# Patient Record
Sex: FEMALE | Race: WHITE | NOT HISPANIC OR LATINO | Employment: FULL TIME | URBAN - METROPOLITAN AREA
[De-identification: names, ages, dates, MRNs, and addresses within clinical notes are randomized per-mention and may not be internally consistent; named-entity substitution may affect disease eponyms.]

---

## 2023-11-08 ENCOUNTER — LAB (OUTPATIENT)
Dept: LAB | Facility: LAB | Age: 58
End: 2023-11-08
Payer: COMMERCIAL

## 2023-11-08 ENCOUNTER — OFFICE VISIT (OUTPATIENT)
Dept: PRIMARY CARE | Facility: CLINIC | Age: 58
End: 2023-11-08
Payer: COMMERCIAL

## 2023-11-08 ENCOUNTER — HOSPITAL ENCOUNTER (OUTPATIENT)
Dept: RADIOLOGY | Facility: HOSPITAL | Age: 58
Discharge: HOME | End: 2023-11-08
Payer: COMMERCIAL

## 2023-11-08 VITALS
DIASTOLIC BLOOD PRESSURE: 68 MMHG | WEIGHT: 126.4 LBS | BODY MASS INDEX: 22.39 KG/M2 | HEART RATE: 76 BPM | OXYGEN SATURATION: 100 % | SYSTOLIC BLOOD PRESSURE: 108 MMHG

## 2023-11-08 DIAGNOSIS — R53.83 OTHER FATIGUE: ICD-10-CM

## 2023-11-08 DIAGNOSIS — R07.9 CHEST PAIN, UNSPECIFIED TYPE: Primary | ICD-10-CM

## 2023-11-08 DIAGNOSIS — E78.00 HYPERCHOLESTEREMIA: ICD-10-CM

## 2023-11-08 DIAGNOSIS — R07.9 CHEST PAIN, UNSPECIFIED TYPE: ICD-10-CM

## 2023-11-08 DIAGNOSIS — Z00.00 ANNUAL PHYSICAL EXAM: ICD-10-CM

## 2023-11-08 DIAGNOSIS — R06.00 DYSPNEA, UNSPECIFIED TYPE: ICD-10-CM

## 2023-11-08 DIAGNOSIS — J45.20 MILD INTERMITTENT ASTHMA WITHOUT COMPLICATION (HHS-HCC): ICD-10-CM

## 2023-11-08 LAB
ALBUMIN SERPL BCP-MCNC: 4.7 G/DL (ref 3.4–5)
ALP SERPL-CCNC: 60 U/L (ref 33–110)
ALT SERPL W P-5'-P-CCNC: 11 U/L (ref 7–45)
ANION GAP SERPL CALC-SCNC: 15 MMOL/L (ref 10–20)
AST SERPL W P-5'-P-CCNC: 16 U/L (ref 9–39)
BASOPHILS # BLD AUTO: 0.05 X10*3/UL (ref 0–0.1)
BASOPHILS NFR BLD AUTO: 0.9 %
BILIRUB SERPL-MCNC: 0.5 MG/DL (ref 0–1.2)
BUN SERPL-MCNC: 11 MG/DL (ref 6–23)
CALCIUM SERPL-MCNC: 9.5 MG/DL (ref 8.6–10.3)
CARDIAC TROPONIN I PNL SERPL HS: <3 NG/L (ref 0–13)
CHLORIDE SERPL-SCNC: 104 MMOL/L (ref 98–107)
CO2 SERPL-SCNC: 29 MMOL/L (ref 21–32)
CREAT SERPL-MCNC: 0.64 MG/DL (ref 0.5–1.05)
D DIMER PPP FEU-MCNC: <215 NG/ML FEU
EOSINOPHIL # BLD AUTO: 0.3 X10*3/UL (ref 0–0.7)
EOSINOPHIL NFR BLD AUTO: 5.2 %
ERYTHROCYTE [DISTWIDTH] IN BLOOD BY AUTOMATED COUNT: 12.2 % (ref 11.5–14.5)
GFR SERPL CREATININE-BSD FRML MDRD: >90 ML/MIN/1.73M*2
GLUCOSE SERPL-MCNC: 103 MG/DL (ref 74–99)
HCT VFR BLD AUTO: 39.8 % (ref 36–46)
HGB BLD-MCNC: 13.4 G/DL (ref 12–16)
IMM GRANULOCYTES # BLD AUTO: 0.01 X10*3/UL (ref 0–0.7)
IMM GRANULOCYTES NFR BLD AUTO: 0.2 % (ref 0–0.9)
LYMPHOCYTES # BLD AUTO: 2.04 X10*3/UL (ref 1.2–4.8)
LYMPHOCYTES NFR BLD AUTO: 35.2 %
MCH RBC QN AUTO: 32.2 PG (ref 26–34)
MCHC RBC AUTO-ENTMCNC: 33.7 G/DL (ref 32–36)
MCV RBC AUTO: 96 FL (ref 80–100)
MONOCYTES # BLD AUTO: 0.28 X10*3/UL (ref 0.1–1)
MONOCYTES NFR BLD AUTO: 4.8 %
NEUTROPHILS # BLD AUTO: 3.11 X10*3/UL (ref 1.2–7.7)
NEUTROPHILS NFR BLD AUTO: 53.7 %
NRBC BLD-RTO: 0 /100 WBCS (ref 0–0)
PLATELET # BLD AUTO: 296 X10*3/UL (ref 150–450)
POTASSIUM SERPL-SCNC: 3.9 MMOL/L (ref 3.5–5.3)
PROT SERPL-MCNC: 7.3 G/DL (ref 6.4–8.2)
RBC # BLD AUTO: 4.16 X10*6/UL (ref 4–5.2)
SODIUM SERPL-SCNC: 144 MMOL/L (ref 136–145)
WBC # BLD AUTO: 5.8 X10*3/UL (ref 4.4–11.3)

## 2023-11-08 PROCEDURE — 93000 ELECTROCARDIOGRAM COMPLETE: CPT | Performed by: INTERNAL MEDICINE

## 2023-11-08 PROCEDURE — 99214 OFFICE O/P EST MOD 30 MIN: CPT | Performed by: INTERNAL MEDICINE

## 2023-11-08 PROCEDURE — 1036F TOBACCO NON-USER: CPT | Performed by: INTERNAL MEDICINE

## 2023-11-08 PROCEDURE — 71250 CT THORAX DX C-: CPT | Mod: FOREIGN READ | Performed by: RADIOLOGY

## 2023-11-08 PROCEDURE — 36415 COLL VENOUS BLD VENIPUNCTURE: CPT

## 2023-11-08 PROCEDURE — 71250 CT THORAX DX C-: CPT

## 2023-11-08 RX ORDER — ALBUTEROL SULFATE 90 UG/1
2 AEROSOL, METERED RESPIRATORY (INHALATION) EVERY 4 HOURS PRN
COMMUNITY
Start: 2003-11-06 | End: 2023-11-08

## 2023-11-08 RX ORDER — ALBUTEROL SULFATE 90 UG/1
2 AEROSOL, METERED RESPIRATORY (INHALATION) EVERY 4 HOURS PRN
Qty: 18 G | Refills: 0 | Status: SHIPPED | OUTPATIENT
Start: 2023-11-08 | End: 2024-11-07

## 2023-11-08 RX ORDER — METHYLPREDNISOLONE 4 MG/1
TABLET ORAL
Qty: 21 TABLET | Refills: 0 | Status: SHIPPED | OUTPATIENT
Start: 2023-11-08 | End: 2023-11-15

## 2023-11-08 NOTE — PROGRESS NOTES
Subjective   Patient ID: Destiny Yañez is a 57 y.o. female who presents for heavy chest (X 3-4 days/Hx of asthma- /Some possible congestion).  HPI      Same day sick    Last seen 11-22    Chest heavy, feels not getting enough air  Denies cough, wheezing, fever    asthma stable on rx no side effects  uses albuterol rarely     Hypercholesterolemia on diet     osteoporosis, vit D  on supplements     GYN follow up thru [facility]  Mammogram 3-23     diet / exercise rev'd    Review of Systems   All other systems reviewed and are negative.      Objective   /68   Pulse 76   Wt 57.3 kg (126 lb 6.4 oz)   SpO2 100%   BMI 22.39 kg/m²   Lab Results   Component Value Date    WBC 5.8 11/08/2023    HGB 13.4 11/08/2023    HCT 39.8 11/08/2023     11/08/2023    CHOL 242 (H) 07/02/2021    TRIG 90 07/02/2021    HDL 72.7 07/02/2021    ALT 11 11/08/2023    AST 16 11/08/2023     11/08/2023    K 3.9 11/08/2023     11/08/2023    CREATININE 0.64 11/08/2023    BUN 11 11/08/2023    CO2 29 11/08/2023    TSH 1.36 07/02/2021           Physical Exam  Vitals reviewed.   Constitutional:       Appearance: Normal appearance. She is normal weight.   HENT:      Head: Normocephalic and atraumatic.      Mouth/Throat:      Pharynx: No posterior oropharyngeal erythema.   Eyes:      General: No scleral icterus.     Conjunctiva/sclera: Conjunctivae normal.      Pupils: Pupils are equal, round, and reactive to light.   Cardiovascular:      Rate and Rhythm: Normal rate and regular rhythm.      Heart sounds: Normal heart sounds.   Pulmonary:      Effort: No respiratory distress.      Breath sounds: No wheezing.   Abdominal:      General: Abdomen is flat. Bowel sounds are normal. There is no distension.      Palpations: Abdomen is soft. There is no mass.      Tenderness: There is no abdominal tenderness. There is no rebound.   Musculoskeletal:         General: Normal range of motion.      Cervical back: Normal range of motion and  neck supple.   Skin:     General: Skin is warm and dry.   Neurological:      General: No focal deficit present.      Mental Status: She is alert and oriented to person, place, and time. Mental status is at baseline.   Psychiatric:         Mood and Affect: Mood normal.         Behavior: Behavior normal.         Thought Content: Thought content normal.         Judgment: Judgment normal.         Problem List Items Addressed This Visit    None  Visit Diagnoses         Codes    Chest pain, unspecified type    -  Primary R07.9    Relevant Orders    ECG 12 lead (Clinic Performed)    Comprehensive Metabolic Panel (Completed)    Troponin I, High Sensitivity (Completed)    D-dimer, VTE Exclusion (Completed)    CT chest wo IV contrast (Completed)    Dyspnea, unspecified type     R06.00    Mild intermittent asthma without complication     J45.20    Relevant Medications    methylPREDNISolone (Medrol Dospak) 4 mg tablets    albuterol 90 mcg/actuation inhaler    Other fatigue     R53.83    Relevant Orders    CBC and Auto Differential (Completed)    Hypercholesteremia     E78.00          Assessment/Plan     Same day sick    Last seen 11-22    Chest heavy, feels not getting enough air x 3-4 days  Denies cough, wheezing, fever  EKG ST elevation V1, V2 short MI  Will need cardio follow up  Check CT chest and labs stat  Acute exacerbation asthma  Start medrol dose melinda as directed  Albuterol inhaler as directed  Risk / benefits rev'd  Rest increase fluids    asthma uses albuterol rarely     Hypercholesterolemia on diet     osteoporosis, vit D  on supplements     GYN follow up thru [facility]  Mammogram 3-23     diet / exercise rev'd    Follow up 1 week

## 2023-12-14 ENCOUNTER — OFFICE VISIT (OUTPATIENT)
Dept: PRIMARY CARE | Facility: CLINIC | Age: 58
End: 2023-12-14
Payer: COMMERCIAL

## 2023-12-14 VITALS
DIASTOLIC BLOOD PRESSURE: 58 MMHG | BODY MASS INDEX: 23.66 KG/M2 | OXYGEN SATURATION: 100 % | WEIGHT: 128.6 LBS | HEIGHT: 62 IN | HEART RATE: 61 BPM | SYSTOLIC BLOOD PRESSURE: 108 MMHG

## 2023-12-14 DIAGNOSIS — E55.9 VITAMIN D DEFICIENCY: ICD-10-CM

## 2023-12-14 DIAGNOSIS — E78.00 HYPERCHOLESTEREMIA: ICD-10-CM

## 2023-12-14 DIAGNOSIS — J45.909 MILD ASTHMA WITHOUT COMPLICATION, UNSPECIFIED WHETHER PERSISTENT (HHS-HCC): ICD-10-CM

## 2023-12-14 DIAGNOSIS — Z12.31 ENCOUNTER FOR SCREENING MAMMOGRAM FOR MALIGNANT NEOPLASM OF BREAST: ICD-10-CM

## 2023-12-14 DIAGNOSIS — F41.9 ANXIETY: ICD-10-CM

## 2023-12-14 DIAGNOSIS — M81.0 AGE RELATED OSTEOPOROSIS, UNSPECIFIED PATHOLOGICAL FRACTURE PRESENCE: ICD-10-CM

## 2023-12-14 DIAGNOSIS — Z00.00 ANNUAL PHYSICAL EXAM: Primary | ICD-10-CM

## 2023-12-14 PROCEDURE — 99214 OFFICE O/P EST MOD 30 MIN: CPT | Performed by: INTERNAL MEDICINE

## 2023-12-14 PROCEDURE — 3008F BODY MASS INDEX DOCD: CPT | Performed by: INTERNAL MEDICINE

## 2023-12-14 PROCEDURE — 1036F TOBACCO NON-USER: CPT | Performed by: INTERNAL MEDICINE

## 2023-12-14 PROCEDURE — 99396 PREV VISIT EST AGE 40-64: CPT | Performed by: INTERNAL MEDICINE

## 2023-12-14 ASSESSMENT — PATIENT HEALTH QUESTIONNAIRE - PHQ9
1. LITTLE INTEREST OR PLEASURE IN DOING THINGS: NOT AT ALL
SUM OF ALL RESPONSES TO PHQ9 QUESTIONS 1 AND 2: 0
2. FEELING DOWN, DEPRESSED OR HOPELESS: NOT AT ALL

## 2023-12-14 NOTE — PROGRESS NOTES
"Subjective   Patient ID: Destiny Yañez is a 58 y.o. female who presents for Annual Exam (Yearly Physical). / follow up  HPI    Yearly physical    Mammogram due 3-24  Dexa 6-2021  Cologuard 11-22 neg  CT chest lung cancer screening n/a  GYN 9-23  immunizations rev'd  BMI 23.5    Follow up        Anxiety due to daughter transitioning       counselled    asthma uses albuterol prn     Hypercholesterolemia on diet     osteoporosis, vit D  on supplements     GYN follow up thru [facility]  Mammogram 3-23     diet / exercise rev'd    Review of Systems   All other systems reviewed and are negative.      Objective   /58   Pulse 61   Ht 1.575 m (5' 2\")   Wt 58.3 kg (128 lb 9.6 oz)   SpO2 100%   BMI 23.52 kg/m²   Lab Results   Component Value Date    WBC 5.8 11/08/2023    HGB 13.4 11/08/2023    HCT 39.8 11/08/2023     11/08/2023    CHOL 242 (H) 07/02/2021    TRIG 90 07/02/2021    HDL 72.7 07/02/2021    ALT 11 11/08/2023    AST 16 11/08/2023     11/08/2023    K 3.9 11/08/2023     11/08/2023    CREATININE 0.64 11/08/2023    BUN 11 11/08/2023    CO2 29 11/08/2023    TSH 1.36 07/02/2021           Physical Exam  Vitals reviewed.   Constitutional:       Appearance: Normal appearance. She is normal weight.      Comments: tearful   HENT:      Head: Normocephalic and atraumatic.      Mouth/Throat:      Pharynx: No posterior oropharyngeal erythema.   Eyes:      General: No scleral icterus.     Conjunctiva/sclera: Conjunctivae normal.      Pupils: Pupils are equal, round, and reactive to light.   Cardiovascular:      Rate and Rhythm: Normal rate and regular rhythm.      Heart sounds: Normal heart sounds.   Pulmonary:      Effort: No respiratory distress.      Breath sounds: No wheezing.   Abdominal:      General: Abdomen is flat. Bowel sounds are normal. There is no distension.      Palpations: Abdomen is soft. There is no mass.      Tenderness: There is no abdominal tenderness. There is no rebound. "   Musculoskeletal:         General: Normal range of motion.      Cervical back: Normal range of motion and neck supple.   Skin:     General: Skin is warm and dry.   Neurological:      General: No focal deficit present.      Mental Status: She is alert and oriented to person, place, and time. Mental status is at baseline.   Psychiatric:         Mood and Affect: Mood normal.         Behavior: Behavior normal.         Thought Content: Thought content normal.         Judgment: Judgment normal.         Problem List Items Addressed This Visit    None  Visit Diagnoses         Codes    Annual physical exam    -  Primary Z00.00    Relevant Orders    CBC and Auto Differential    Comprehensive Metabolic Panel    Lipid Panel    TSH with reflex to Free T4 if abnormal    Mild asthma without complication, unspecified whether persistent     J45.909    Hypercholesteremia     E78.00    Anxiety     F41.9    Encounter for screening mammogram for malignant neoplasm of breast     Z12.31    Relevant Orders    BI mammo bilateral screening tomosynthesis    Age related osteoporosis, unspecified pathological fracture presence     M81.0    Relevant Orders    XR DEXA bone density    Vitamin D deficiency     E55.9    Relevant Orders    Vitamin D 25-Hydroxy,Total (for eval of Vitamin D levels)    BMI 23.0-23.9, adult     Z68.23          Assessment/Plan       Yearly physical    Mammogram due 3-24  Dexa 6-2021  Cologuard 11-22 neg  CT chest lung cancer screening n/a  GYN 9-23  immunizations rev'd  BMI 23.5    Follow up        Anxiety due to daughter transitioning       counselled    asthma uses albuterol prn     Hypercholesterolemia on diet     osteoporosis, vit D  on supplements     GYN follow up thru [facility]  Mammogram 3-23     diet / exercise rev'd    Check labs, dexa    Follow up pending

## 2023-12-30 ENCOUNTER — HOSPITAL ENCOUNTER (OUTPATIENT)
Dept: RADIOLOGY | Facility: EXTERNAL LOCATION | Age: 58
Discharge: HOME | End: 2023-12-30
Payer: COMMERCIAL

## 2023-12-30 DIAGNOSIS — R52 PAIN: ICD-10-CM

## 2024-01-12 ENCOUNTER — OFFICE VISIT (OUTPATIENT)
Dept: ORTHOPEDIC SURGERY | Facility: CLINIC | Age: 59
End: 2024-01-12
Payer: COMMERCIAL

## 2024-01-12 DIAGNOSIS — M25.572 CHRONIC PAIN OF LEFT ANKLE: ICD-10-CM

## 2024-01-12 DIAGNOSIS — G89.29 CHRONIC PAIN OF LEFT ANKLE: ICD-10-CM

## 2024-01-12 PROCEDURE — 1036F TOBACCO NON-USER: CPT

## 2024-01-12 PROCEDURE — 99203 OFFICE O/P NEW LOW 30 MIN: CPT

## 2024-01-12 PROCEDURE — 3008F BODY MASS INDEX DOCD: CPT

## 2024-01-12 ASSESSMENT — PAIN - FUNCTIONAL ASSESSMENT: PAIN_FUNCTIONAL_ASSESSMENT: 0-10

## 2024-01-12 ASSESSMENT — PAIN SCALES - GENERAL: PAINLEVEL_OUTOF10: 2

## 2024-01-12 ASSESSMENT — PAIN DESCRIPTION - DESCRIPTORS: DESCRIPTORS: ACHING;SORE

## 2024-01-12 NOTE — PROGRESS NOTES
HPI  Destiny Yañez is a 58 y.o. female  in office today for   Chief Complaint   Patient presents with    Left Ankle - Pain     X months no trauma. Frequently runs and jumps rope for exercises. States she is having intermittent pain and edema.    .  she has tried rest, wearing a boot, pain medication.  Pain mostly on inside of ankle over the malleolus and behind the malleolus.      Medication  Current Outpatient Medications on File Prior to Visit   Medication Sig Dispense Refill    albuterol 90 mcg/actuation inhaler Inhale 2 puffs every 4 hours if needed for wheezing. 18 g 0     No current facility-administered medications on file prior to visit.       Physical Exam  Constitutional: well developed, well nourished female in no acute distress  Psychiatric: normal mood, appropriate affect  Eyes: sclera anicteric  HENT: normocephalic/atraumatic  CV: regular rate and rhythm   Respiratory: non labored breathing  Integumentary: no rash  Neurological: moves all extremities    Left Ankle Exam     Tenderness   The patient is experiencing tenderness in the lateral malleolus.   Swelling: mild    Range of Motion   Dorsiflexion:  normal   Plantar flexion:  normal     Tests   Varus tilt: negative    Other   Erythema: absent  Scars: absent  Sensation: normal              Imaging/Lab:  X-rays were taken 12/30/23 which were reviewed by myself and read by radiology and show no evidence of fracture or dislocation.  Nonspecific diffuse soft tissue swelling around the distal lower extremity      Assessment  Assessment: left ankle pain    Plan  Plan:  History, physical exam, and imaging were reviewed with patient. Discussed options for the ankle including activity modification, orthotics, quality of shoes, RICE, antiinflammatories, and PT.  Recommending PT given chronic nature of pain.  Also discussed an MRI as another option, but would recommend therapy first.  Follow Up: Patient to follow up as needed if pain persists or gets  worse after trial of PT.      All questions were answered for the patient prior to end of exam and patient addressed their understanding.    Karina Ashby PA-C  01/12/24

## 2024-03-21 ENCOUNTER — EVALUATION (OUTPATIENT)
Dept: PHYSICAL THERAPY | Facility: CLINIC | Age: 59
End: 2024-03-21
Payer: COMMERCIAL

## 2024-03-21 DIAGNOSIS — M25.572 CHRONIC PAIN OF LEFT ANKLE: ICD-10-CM

## 2024-03-21 DIAGNOSIS — G89.29 CHRONIC PAIN OF LEFT ANKLE: ICD-10-CM

## 2024-03-21 DIAGNOSIS — M77.52 LEFT ANKLE TENDINITIS: Primary | ICD-10-CM

## 2024-03-21 PROCEDURE — 97110 THERAPEUTIC EXERCISES: CPT | Mod: GP | Performed by: PHYSICAL THERAPIST

## 2024-03-21 PROCEDURE — 97162 PT EVAL MOD COMPLEX 30 MIN: CPT | Mod: GP | Performed by: PHYSICAL THERAPIST

## 2024-03-21 NOTE — PROGRESS NOTES
"Physical Therapy Evaluation and Treatment    Patient Name: Destiny Yañez  MRN: [id number]  Evaluation Date: 3/21/2024  Time Calculation  Start Time: 1110  Stop Time: 1200  Time Calculation (min): 50 min    Current Problem:   1. Left ankle tendinitis        2. Chronic pain of left ankle  Referral to Physical Therapy    Follow Up In Physical Therapy          Subjective  /General:     Patient reported hx of current condition/injury: insidious onset of pain about 1 year ago with progressive worsening. Notes she likes to run and jump rope a lot but no change in habits or intensities prior to pain starting. Tried a couple chiropractic treatments without change. Then saw PCP who referred for PT.    Precautions:  Precautions  Medical Precautions: No known precautions/limitation  Pain:  Pain Score:  (pain floats 0-10/10, worse with walking/running, stairs, up after static positioning and end of the day. Better with rest and ice.)  Pain Location:  (medial lt ankle)  Pain Frequency: Intermittent  Home Living:   Limitations on stairs and with home function involving standing. Also limited with walking activities at work and unable to run or do her jump rope    Prior Function Per Pt/Caregiver Report:   Normal home/work and recreationally.    OBJECTIVE:  Objective   Posture:   Genu valgus with increased pronation  Range of Motion:   WFL BLE except lt ankle DF 5 degrees  Strength:  5/5 BLE's except lt ankle DF4+, PF4, inver 3- and ever 3/ Rt ankle inver/ever 4/5   Flexibility:   Poor calves and tight quad/hs's  Palpation:   Tender medial anle along post tib tendon  Gait:   Midly antalgic, FWB  Balance:   SLS firm surface: 10sec hold rt, 3\" left     Outcome Measures:  44% impairment on FAAM daily living, 78% with sports subset     Treatments:  Started posture educ/body mech. Exer + towel scrunch, tien ankle all planes and belt calf stretch    HEP to be completed daily, exercises include:  All new exer's    OP " EDUCATION:  Outpatient Education  Individual(s) Educated: Patient  Education Provided: Anatomy, Body Mechanics, Home Exercise Program, Posture  Diagnosis and Precautions: Post. tib tendonitis  Patient Response to Education: Patient/Caregiver Verbalized Understanding of Information, Patient/Caregiver Performed Return Demonstration of Exercises/Activities    Assessment  PT Assessment Results: Decreased strength, Decreased range of motion, Impaired balance, Pain  Rehab Prognosis: Good  Evaluation/Treatment Tolerance: Patient tolerated treatment well    Pt is a 58 y.o. Female who presents with impairments of LLE. These impairments have led to functional limitations including home/work and recreational function. Pt would benefit from skilled physical therapy intervention to improve above impairments and facilitate return to function.    Plan  Treatment/Interventions: Cryotherapy, Electrical stimulation, Hot pack, Manual therapy, Neuromuscular re-education, Self care/ home management, Therapeutic exercises  PT Plan: Skilled PT  PT Frequency: 2 times per week  Duration: 6-8 weeks  Rehab Potential: Good  Plan of Care Agreement: Patient    Goals:  STG:  Improved postural awareness           Lt ankle DF to 10 degrees to allow normal gait           Tolerate 1/2 day at work without pain           Walk 30's  LTG:  Tolerate full work day           Home function at least 90%           Safe return to regular exer program.

## 2024-03-26 ENCOUNTER — TREATMENT (OUTPATIENT)
Dept: PHYSICAL THERAPY | Facility: CLINIC | Age: 59
End: 2024-03-26
Payer: COMMERCIAL

## 2024-03-26 DIAGNOSIS — M77.52 LEFT ANKLE TENDINITIS: Primary | ICD-10-CM

## 2024-03-26 DIAGNOSIS — M25.572 CHRONIC PAIN OF LEFT ANKLE: ICD-10-CM

## 2024-03-26 DIAGNOSIS — G89.29 CHRONIC PAIN OF LEFT ANKLE: ICD-10-CM

## 2024-03-26 PROCEDURE — 97110 THERAPEUTIC EXERCISES: CPT | Mod: GP | Performed by: PHYSICAL THERAPIST

## 2024-03-26 PROCEDURE — 97112 NEUROMUSCULAR REEDUCATION: CPT | Mod: GP | Performed by: PHYSICAL THERAPIST

## 2024-03-26 ASSESSMENT — PAIN SCALES - GENERAL: PAINLEVEL_OUTOF10: 3

## 2024-03-26 NOTE — PROGRESS NOTES
Physical Therapy Treatment    Patient Name: Destiny Yañez  MRN: [id number]  Today's Date: 3/26/2024  Time Calculation  Start Time: 0815  Stop Time: 0905  Time Calculation (min): 50 min    Visit Number:  2 / 10   Visit Authorized:  10    Current Problem  1. Left ankle tendinitis        2. Chronic pain of left ankle  Follow Up In Physical Therapy          Precautions  Precautions  Precautions Comment: none    Pain  Pain Score: 3    Subjective/General     Not much change overall. Noted feeling better after last session for a day or so and feels exer's went well. Went for a walk yesterday and sore from that.    Objective  Gait normal    Treatment:  Therapeutic Exercise  Therapeutic Exercise Activity 1: stretching/strengthening (Con't with exer as previous and added active ankle DF/inv/ever, seated calf raise, Total gym squats unilat, wall calf stretch and bike.)    Balance/Neuromuscular Re-Education  Balance/Neuromuscular Re-Education Activity 1: proprio training (started seated BAP's and SLS firm surface.)    Modalities  Modality 1: Untimed Hotpack    OP EDUCATION:  All new exer's added to HEP except bike, total gym and BAP's     Assessment:   Progressed to proprio work and increased strengthening/stretching. Did well with exer's, SLS challenging.    End of session pain rating:  3/10    Plan:     Continue with current POC with the following changes , progress exer as able    Assessment of current progress against goals:  Symptomatic relief with treatment  Goals:   Indep HEP

## 2024-04-04 ENCOUNTER — APPOINTMENT (OUTPATIENT)
Dept: PHYSICAL THERAPY | Facility: CLINIC | Age: 59
End: 2024-04-04
Payer: COMMERCIAL

## 2024-04-23 ENCOUNTER — TREATMENT (OUTPATIENT)
Dept: PHYSICAL THERAPY | Facility: CLINIC | Age: 59
End: 2024-04-23
Payer: COMMERCIAL

## 2024-04-23 DIAGNOSIS — M25.572 CHRONIC PAIN OF LEFT ANKLE: ICD-10-CM

## 2024-04-23 DIAGNOSIS — M77.52 LEFT ANKLE TENDINITIS: Primary | ICD-10-CM

## 2024-04-23 DIAGNOSIS — G89.29 CHRONIC PAIN OF LEFT ANKLE: ICD-10-CM

## 2024-04-23 PROCEDURE — 97112 NEUROMUSCULAR REEDUCATION: CPT | Mod: GP | Performed by: PHYSICAL THERAPIST

## 2024-04-23 PROCEDURE — 97110 THERAPEUTIC EXERCISES: CPT | Mod: GP | Performed by: PHYSICAL THERAPIST

## 2024-04-23 ASSESSMENT — PAIN SCALES - GENERAL: PAINLEVEL_OUTOF10: 1

## 2024-04-23 NOTE — PROGRESS NOTES
Physical Therapy Treatment    Patient Name: Destiny Yañez  MRN: [id number]  Today's Date: 2024  Time Calculation  Start Time: 0800  Stop Time: 0845  Time Calculation (min): 45 min    Visit Number:  3 / 10   Visit Authorized:  20    Current Problem  1. Left ankle tendinitis        2. Chronic pain of left ankle  Follow Up In Physical Therapy          Precautions  Precautions  Precautions Comment: none    Pain  Pain Score: 1    Subjective/General     Pain still up and down a lot. Maybe a little better. Not great with getting exer's done while traveling over pas 2 weeks.    Objective  Gait normal, ankle DF 6    Treatment:  Therapeutic Exercise  Therapeutic Exercise Activity 1: stretching/strengthening    Balance/Neuromuscular Re-Education  Balance/Neuromuscular Re-Education Activity 1: proprio training (Added 4 way hip)    OP EDUCATION:       Assessment:       End of session pain ratin/10    Plan:     Continue with current POC with the following changes , con't to progress proprio work.    Assessment of current progress against goals:  Progressing toward functional goals  Goals:   Ankle DF 10 degrees

## 2024-04-25 ENCOUNTER — TREATMENT (OUTPATIENT)
Dept: PHYSICAL THERAPY | Facility: CLINIC | Age: 59
End: 2024-04-25
Payer: COMMERCIAL

## 2024-04-25 DIAGNOSIS — G89.29 CHRONIC PAIN OF LEFT ANKLE: ICD-10-CM

## 2024-04-25 DIAGNOSIS — M25.572 CHRONIC PAIN OF LEFT ANKLE: ICD-10-CM

## 2024-04-25 DIAGNOSIS — M77.52 LEFT ANKLE TENDINITIS: Primary | ICD-10-CM

## 2024-04-25 PROCEDURE — 97112 NEUROMUSCULAR REEDUCATION: CPT | Mod: GP | Performed by: PHYSICAL THERAPIST

## 2024-04-25 PROCEDURE — 97110 THERAPEUTIC EXERCISES: CPT | Mod: GP | Performed by: PHYSICAL THERAPIST

## 2024-04-25 ASSESSMENT — PAIN SCALES - GENERAL: PAINLEVEL_OUTOF10: 2

## 2024-04-30 ENCOUNTER — TREATMENT (OUTPATIENT)
Dept: PHYSICAL THERAPY | Facility: CLINIC | Age: 59
End: 2024-04-30
Payer: COMMERCIAL

## 2024-04-30 DIAGNOSIS — M25.572 CHRONIC PAIN OF LEFT ANKLE: ICD-10-CM

## 2024-04-30 DIAGNOSIS — M77.52 LEFT ANKLE TENDINITIS: Primary | ICD-10-CM

## 2024-04-30 DIAGNOSIS — G89.29 CHRONIC PAIN OF LEFT ANKLE: ICD-10-CM

## 2024-04-30 PROCEDURE — 97110 THERAPEUTIC EXERCISES: CPT | Mod: GP | Performed by: PHYSICAL THERAPIST

## 2024-04-30 PROCEDURE — 97112 NEUROMUSCULAR REEDUCATION: CPT | Mod: GP | Performed by: PHYSICAL THERAPIST

## 2024-04-30 ASSESSMENT — PAIN SCALES - GENERAL: PAINLEVEL_OUTOF10: 3

## 2024-04-30 NOTE — PROGRESS NOTES
Physical Therapy Treatment    Patient Name: Destiny Yañez  MRN: [id number]  Today's Date: 2024  Time Calculation  Start Time: 0850  Stop Time: 35  Time Calculation (min): 45 min    Visit Number:  5 / 10   Visit Authorized:  10    Current Problem  1. Left ankle tendinitis        2. Chronic pain of left ankle  Follow Up In Physical Therapy          Precautions  Precautions  Precautions Comment: none    Pain  Pain Score: 3    Subjective/General     Doing about the same. Exer's helping with strength and mobility but pain not changing much.    Objective  Gait normal    Treatment:  Therapeutic Exercise  Therapeutic Exercise Activity 1: stretching/strengthening    Balance/Neuromuscular Re-Education  Balance/Neuromuscular Re-Education Activity 1: proprio training (started standing BAPs and stand TKE.)    OP EDUCATION:  Added standing TKE to HEP     Assessment:   Able to progress standing stabilization exer without pain increase.    End of session pain ratin/10    Plan:     Continue with current POC/no changes    Assessment of current progress against goals:  Progressing toward functional goals  Goals:   Indep HEP

## 2024-05-07 ENCOUNTER — TREATMENT (OUTPATIENT)
Dept: PHYSICAL THERAPY | Facility: CLINIC | Age: 59
End: 2024-05-07
Payer: COMMERCIAL

## 2024-05-07 DIAGNOSIS — G89.29 CHRONIC PAIN OF LEFT ANKLE: ICD-10-CM

## 2024-05-07 DIAGNOSIS — M77.52 LEFT ANKLE TENDINITIS: Primary | ICD-10-CM

## 2024-05-07 DIAGNOSIS — M25.572 CHRONIC PAIN OF LEFT ANKLE: ICD-10-CM

## 2024-05-07 PROCEDURE — 97110 THERAPEUTIC EXERCISES: CPT | Mod: GP | Performed by: PHYSICAL THERAPIST

## 2024-05-07 PROCEDURE — 97112 NEUROMUSCULAR REEDUCATION: CPT | Mod: GP | Performed by: PHYSICAL THERAPIST

## 2024-05-07 ASSESSMENT — PAIN SCALES - GENERAL: PAINLEVEL_OUTOF10: 1

## 2024-05-07 NOTE — PROGRESS NOTES
"Physical Therapy Treatment    Patient Name: Destiny Yañez  MRN: [id number]  Today's Date: 2024  Time Calculation  Start Time: 1545  Stop Time: 1630  Time Calculation (min): 45 min    Visit Number:  6 / 10   Visit Authorized:  10      Current Problem  1. Left ankle tendinitis        2. Chronic pain of left ankle  Follow Up In Physical Therapy          Precautions  Precautions  Precautions Comment: none    Pain  Pain Score: 1    Subjective/General     Feels better. Woke up this morning with minimal discomfort and still feels pretty good.    Objective  Gait normal      Treatment:  Therapeutic Exercise  Therapeutic Exercise Activity 1: stretching/strengthening (Added lateral step up 4\")    Balance/Neuromuscular Re-Education  Balance/Neuromuscular Re-Education Activity 1: proprio training (started SLS with UE mvm't firm surface)      Assessment:  Progressed proprio and strength work today. Balance improving.     End of session pain ratin/10    Plan:     Continue with current POC/no changes    Assessment of current progress against goals:  Progressing toward functional goals  Goals:   Indep HEP  "

## 2024-05-09 ENCOUNTER — TREATMENT (OUTPATIENT)
Dept: PHYSICAL THERAPY | Facility: CLINIC | Age: 59
End: 2024-05-09
Payer: COMMERCIAL

## 2024-05-09 DIAGNOSIS — M77.52 LEFT ANKLE TENDINITIS: Primary | ICD-10-CM

## 2024-05-09 DIAGNOSIS — G89.29 CHRONIC PAIN OF LEFT ANKLE: ICD-10-CM

## 2024-05-09 DIAGNOSIS — M25.572 CHRONIC PAIN OF LEFT ANKLE: ICD-10-CM

## 2024-05-09 PROCEDURE — 97110 THERAPEUTIC EXERCISES: CPT | Mod: GP | Performed by: PHYSICAL THERAPIST

## 2024-05-09 PROCEDURE — 97112 NEUROMUSCULAR REEDUCATION: CPT | Mod: GP | Performed by: PHYSICAL THERAPIST

## 2024-05-09 ASSESSMENT — PAIN SCALES - GENERAL: PAINLEVEL_OUTOF10: 1

## 2024-05-09 NOTE — PROGRESS NOTES
Physical Therapy Treatment    Patient Name: Destiny Yañez  MRN: [id number]  Today's Date: 2024  Time Calculation  Start Time: 0800  Stop Time: 0845  Time Calculation (min): 45 min    Visit Number:  6 / 10   Visit Authorized:  20    Current Problem  1. Left ankle tendinitis        2. Chronic pain of left ankle  Follow Up In Physical Therapy          Precautions  Precautions  Precautions Comment: none    Pain  Pain Score: 1    Subjective/General     Got a little sore after last session, better today.    Objective  Ankle DF to 10 degrees and strength 4 to 4+/5 through ankle.    Treatment:  Therapeutic Exercise  Therapeutic Exercise Activity 1: stretching/strengthening    Balance/Neuromuscular Re-Education  Balance/Neuromuscular Re-Education Activity 1: proprio training       Assessment:  Completed full exer program. No increase due to soreness after last session.     End of session pain ratin/10    Plan:     Continue with current POC/no changes    Assessment of current progress against goals:  Progressing toward functional goals  Goals:   Indep HEP

## 2024-05-14 ENCOUNTER — TREATMENT (OUTPATIENT)
Dept: PHYSICAL THERAPY | Facility: CLINIC | Age: 59
End: 2024-05-14
Payer: COMMERCIAL

## 2024-05-14 DIAGNOSIS — M25.572 CHRONIC PAIN OF LEFT ANKLE: ICD-10-CM

## 2024-05-14 DIAGNOSIS — M77.52 LEFT ANKLE TENDINITIS: Primary | ICD-10-CM

## 2024-05-14 DIAGNOSIS — G89.29 CHRONIC PAIN OF LEFT ANKLE: ICD-10-CM

## 2024-05-14 PROCEDURE — 97110 THERAPEUTIC EXERCISES: CPT | Mod: GP | Performed by: PHYSICAL THERAPIST

## 2024-05-14 PROCEDURE — 97112 NEUROMUSCULAR REEDUCATION: CPT | Mod: GP | Performed by: PHYSICAL THERAPIST

## 2024-05-14 ASSESSMENT — PAIN SCALES - GENERAL: PAINLEVEL_OUTOF10: 1

## 2024-05-14 NOTE — PROGRESS NOTES
Physical Therapy Treatment    Patient Name: Destiny Yañez  MRN: [id number]  Today's Date: 2024  Time Calculation  Start Time: 1630  Stop Time: 1715  Time Calculation (min): 45 min    Visit Number:  8 / 10   Visit Authorized:  10    Current Problem  1. Left ankle tendinitis        2. Chronic pain of left ankle  Follow Up In Physical Therapy          Precautions  Precautions  Precautions Comment: none    Pain  Pain Score: 1    Subjective/General     In general seeing improvement but still gets occ. jabs of pain with walking, being on feet. HEP good.    Objective  Gait normal  Treatment:  Therapeutic Exercise  Therapeutic Exercise Activity 1: stretching/strengthening    Balance/Neuromuscular Re-Education  Balance/Neuromuscular Re-Education Activity 1: proprio training (started SLS work on soft surface)    OP EDUCATION:   SLS on soft surface    Assessment:  Completed full exer program. Unable to increase standing BAP's or step height due to discomfort.     End of session pain ratin/10    Plan:     Continue with current POC with the following changes , progress exer as able    Assessment of current progress against goals:  Progressing toward functional goals  Goals:   Indep HEP.

## 2024-05-28 ENCOUNTER — TELEPHONE (OUTPATIENT)
Dept: ORTHOPEDIC SURGERY | Facility: CLINIC | Age: 59
End: 2024-05-28
Payer: COMMERCIAL

## 2024-05-28 DIAGNOSIS — G89.29 CHRONIC PAIN OF LEFT ANKLE: ICD-10-CM

## 2024-05-28 DIAGNOSIS — M25.572 CHRONIC PAIN OF LEFT ANKLE: ICD-10-CM

## 2024-05-28 NOTE — TELEPHONE ENCOUNTER
1/12/24 lt ankle pain  Patient called and stated she has been doing the PT, she does seem to have gotten some strength in that ankle but still having a great deal of pain. Wants to proceed with the MRI. Can we order that?

## 2024-06-06 ENCOUNTER — TREATMENT (OUTPATIENT)
Dept: PHYSICAL THERAPY | Facility: CLINIC | Age: 59
End: 2024-06-06
Payer: COMMERCIAL

## 2024-06-06 DIAGNOSIS — M77.52 LEFT ANKLE TENDINITIS: Primary | ICD-10-CM

## 2024-06-06 PROCEDURE — 97112 NEUROMUSCULAR REEDUCATION: CPT | Mod: GP | Performed by: PHYSICAL THERAPIST

## 2024-06-06 PROCEDURE — 97110 THERAPEUTIC EXERCISES: CPT | Mod: GP | Performed by: PHYSICAL THERAPIST

## 2024-06-06 ASSESSMENT — PAIN SCALES - GENERAL: PAINLEVEL_OUTOF10: 3

## 2024-06-06 NOTE — PROGRESS NOTES
Physical Therapy Treatment    Patient Name: Destiny Yañez  MRN: [id number]  Today's Date: 2024  Time Calculation  Start Time: 1145  Stop Time: 1230  Time Calculation (min): 45 min    Visit Number:  9 / 10   Visit Authorized:  20    Current Problem  1. Left ankle tendinitis  Follow Up In Physical Therapy          Precautions  Precautions  Precautions Comment: none    Pain  Pain Score: 3    Subjective/General     Getting a little frustrated. Still gets sore if she walks too much. Overall seeing improvement and legs definitely stronger. Is getting an MRI .    Objective  Gait normal    Treatment:  Therapeutic Exercise  Therapeutic Exercise Activity 1: stretcdhing/strengthening    Balance/Neuromuscular Re-Education  Balance/Neuromuscular Re-Education Activity 1: proprio training (added mini squats on BAP's)      Assessment:   Completed full exer program. Will hold care pending MRI results.    End of session pain ratin/10    Plan:     Continue with current POC/no changes    Assessment of current progress against goals:  Progressing toward functional goals  Goals:   Indep HEP

## 2024-06-12 ENCOUNTER — HOSPITAL ENCOUNTER (OUTPATIENT)
Dept: RADIOLOGY | Facility: HOSPITAL | Age: 59
Discharge: HOME | End: 2024-06-12
Payer: COMMERCIAL

## 2024-06-12 DIAGNOSIS — G89.29 CHRONIC PAIN OF LEFT ANKLE: ICD-10-CM

## 2024-06-12 DIAGNOSIS — M25.572 CHRONIC PAIN OF LEFT ANKLE: ICD-10-CM

## 2024-06-12 PROCEDURE — 73721 MRI JNT OF LWR EXTRE W/O DYE: CPT | Mod: LEFT SIDE | Performed by: STUDENT IN AN ORGANIZED HEALTH CARE EDUCATION/TRAINING PROGRAM

## 2024-06-12 PROCEDURE — 73721 MRI JNT OF LWR EXTRE W/O DYE: CPT | Mod: LT

## 2024-06-14 ENCOUNTER — TELEPHONE (OUTPATIENT)
Dept: ORTHOPEDIC SURGERY | Facility: CLINIC | Age: 59
End: 2024-06-14
Payer: COMMERCIAL

## 2024-06-14 NOTE — TELEPHONE ENCOUNTER
Per Karina Ashby PA-C she would like the patient to see a foot and ankle specialist, I gave her the numbers to dr vieira and dr mejia

## 2024-07-15 ENCOUNTER — HOSPITAL ENCOUNTER (OUTPATIENT)
Dept: RADIOLOGY | Facility: CLINIC | Age: 59
Discharge: HOME | End: 2024-07-15
Payer: COMMERCIAL

## 2024-07-15 ENCOUNTER — OFFICE VISIT (OUTPATIENT)
Dept: ORTHOPEDIC SURGERY | Facility: CLINIC | Age: 59
End: 2024-07-15
Payer: COMMERCIAL

## 2024-07-15 VITALS — HEIGHT: 62 IN | WEIGHT: 129 LBS | BODY MASS INDEX: 23.74 KG/M2

## 2024-07-15 DIAGNOSIS — M76.829 PTTD (POSTERIOR TIBIAL TENDON DYSFUNCTION): ICD-10-CM

## 2024-07-15 DIAGNOSIS — M76.829 PTTD (POSTERIOR TIBIAL TENDON DYSFUNCTION): Primary | ICD-10-CM

## 2024-07-15 DIAGNOSIS — M21.42 ACQUIRED PES PLANUS OF LEFT FOOT: ICD-10-CM

## 2024-07-15 PROCEDURE — 99214 OFFICE O/P EST MOD 30 MIN: CPT | Performed by: ORTHOPAEDIC SURGERY

## 2024-07-15 PROCEDURE — 73630 X-RAY EXAM OF FOOT: CPT | Mod: LEFT SIDE | Performed by: RADIOLOGY

## 2024-07-15 PROCEDURE — 73630 X-RAY EXAM OF FOOT: CPT | Mod: LT

## 2024-07-15 PROCEDURE — 3008F BODY MASS INDEX DOCD: CPT | Performed by: ORTHOPAEDIC SURGERY

## 2024-07-15 ASSESSMENT — PAIN - FUNCTIONAL ASSESSMENT: PAIN_FUNCTIONAL_ASSESSMENT: 0-10

## 2024-07-15 ASSESSMENT — PAIN SCALES - GENERAL: PAINLEVEL_OUTOF10: 1

## 2024-07-15 ASSESSMENT — PAIN DESCRIPTION - DESCRIPTORS: DESCRIPTORS: ACHING

## 2024-07-15 NOTE — PROGRESS NOTES
58-year-old female here for left foot pain.  Is a chronic pain in left foot over the last few years.  She has done physical therapy.  She is still doing the exercise program.  She like to walk for exercise.  Occasional likes to jog.  He said jumping rope a few years ago.  Denies any specific injury.  She is always been flat-footed.  Noted some improvement with orthotic inserts.  No diabetes.  Non-smoker.    On exam:  WD/WN thin female  A+O X3  NAD  No lymphedema  Inspection of both feet and ankles show asymmetric arches.  Flexible pes planus on the right, flexible pes planovalgus on left.  Able to STR bilaterally except left heel stays valgus.  Correctable..   5/5 strength in all 4 planes except 4/5 left PTT.  Tender thickening left distal PTT tendon..   Sensation intact to LT.   Good pulses.   Stable anterior drawer.  No peroneal subluxation.    (-) Isiah.     I personally reviewed the following radiographic exams: X-ray left foot weightbearing shows some loss of longitudinal arch with mild forefoot abduction.  MRI shows severe distal PTT tendinosis thick.  Thickening of spring ligament.  Hindfoot valgus.    Assessment: Left PTT dysfunction with adult flatfoot    Plan: Discussed nonoperative and operative options in detail.   Risk and benefits discussed in detail. All questions answered today.  Recovery timeline and expectations discussed in detail.  Discussed surgical options of tendon reconstruction and arch reconstruction versus hindfoot fusion.  No urgency to surgery from my standpoint.  We discussed continuing supportive arches in her shoes and the physical therapy exercises.  Discussed effective impact activities on her foot.  Will follow-up if she worsens or wants to discuss surgery.

## 2024-07-30 ENCOUNTER — DOCUMENTATION (OUTPATIENT)
Dept: PHYSICAL THERAPY | Facility: CLINIC | Age: 59
End: 2024-07-30
Payer: COMMERCIAL

## 2024-07-30 NOTE — PROGRESS NOTES
Physical Therapy    Discharge Summary    Name: Destiny Yañez  MRN: [id number]  : 1965  Date: 24    Discharge Summary: PT    Discharge Information: Date of discharge 24, Date of evaluation 3/21/24, and Number of attended visits 9    Therapy Summary: Treatment program consistee of a progressive exer program with pain modalities PRN. Last visit was . At that time she noted feeling better and being more active but still had some pain and limitations with more aggressive exercise program.  At that time her ROM and strength were grossly normal and gait was normal for walking.  She did have an indep HEP to con't with. Her plan was to f/u with her DrDelonte for possible MRI.    Discharge Status: Pt has not rescheduled nor reported any further problems. She was indep with a HEP and functionally improved.      Rehab Discharge Reason: Most goals attained and to con't with HEP

## 2025-08-06 ENCOUNTER — APPOINTMENT (OUTPATIENT)
Dept: PRIMARY CARE | Facility: CLINIC | Age: 60
End: 2025-08-06
Payer: COMMERCIAL

## 2025-08-06 VITALS
HEIGHT: 62 IN | HEART RATE: 72 BPM | DIASTOLIC BLOOD PRESSURE: 66 MMHG | BODY MASS INDEX: 25.43 KG/M2 | WEIGHT: 138.2 LBS | OXYGEN SATURATION: 99 % | SYSTOLIC BLOOD PRESSURE: 114 MMHG | TEMPERATURE: 96.4 F

## 2025-08-06 DIAGNOSIS — F41.9 ANXIETY: ICD-10-CM

## 2025-08-06 DIAGNOSIS — M19.90 ARTHRITIS: ICD-10-CM

## 2025-08-06 DIAGNOSIS — E78.00 HYPERCHOLESTEREMIA: ICD-10-CM

## 2025-08-06 DIAGNOSIS — Z00.00 ROUTINE GENERAL MEDICAL EXAMINATION AT A HEALTH CARE FACILITY: Primary | ICD-10-CM

## 2025-08-06 DIAGNOSIS — E55.9 VITAMIN D DEFICIENCY: ICD-10-CM

## 2025-08-06 DIAGNOSIS — J45.20 MILD INTERMITTENT ASTHMA WITHOUT COMPLICATION (HHS-HCC): ICD-10-CM

## 2025-08-06 DIAGNOSIS — M81.0 POSTMENOPAUSAL OSTEOPOROSIS: ICD-10-CM

## 2025-08-06 PROCEDURE — 99214 OFFICE O/P EST MOD 30 MIN: CPT | Performed by: INTERNAL MEDICINE

## 2025-08-06 PROCEDURE — 99396 PREV VISIT EST AGE 40-64: CPT | Performed by: INTERNAL MEDICINE

## 2025-08-06 PROCEDURE — 3008F BODY MASS INDEX DOCD: CPT | Performed by: INTERNAL MEDICINE

## 2025-08-06 ASSESSMENT — ANXIETY QUESTIONNAIRES
3. WORRYING TOO MUCH ABOUT DIFFERENT THINGS: SEVERAL DAYS
GAD7 TOTAL SCORE: 3
6. BECOMING EASILY ANNOYED OR IRRITABLE: SEVERAL DAYS
7. FEELING AFRAID AS IF SOMETHING AWFUL MIGHT HAPPEN: NOT AT ALL
1. FEELING NERVOUS, ANXIOUS, OR ON EDGE: SEVERAL DAYS
4. TROUBLE RELAXING: NOT AT ALL
2. NOT BEING ABLE TO STOP OR CONTROL WORRYING: NOT AT ALL
IF YOU CHECKED OFF ANY PROBLEMS ON THIS QUESTIONNAIRE, HOW DIFFICULT HAVE THESE PROBLEMS MADE IT FOR YOU TO DO YOUR WORK, TAKE CARE OF THINGS AT HOME, OR GET ALONG WITH OTHER PEOPLE: NOT DIFFICULT AT ALL
5. BEING SO RESTLESS THAT IT IS HARD TO SIT STILL: NOT AT ALL

## 2025-08-06 ASSESSMENT — PATIENT HEALTH QUESTIONNAIRE - PHQ9: 1. LITTLE INTEREST OR PLEASURE IN DOING THINGS: NOT AT ALL

## 2025-08-06 NOTE — PROGRESS NOTES
"Subjective   Patient ID: Destiny Yañez is a 59 y.o. female who presents for Annual Exam and follow up    HPI    Last seen 12-23    Yearly physical    Mammogram  12-24  Dexa 6-2021 osteoporosis  Cologuard 11-22 neg  CT chest lung cancer screening n/a  GYN 9-23  immunizations rev'd shingrix, pneumo,  tetanus pt declined  BMI 23.2    Follow up         Anxiety due to daughter transitioning       counselled     asthma uses albuterol prn     Hypercholesterolemia on diet     osteoporosis, vit D  on supplements     GYN follow up thru [facility]  Mammogram 12-24     diet / exercise rev'd     Check labs, dexa    Review of Systems   All other systems reviewed and are negative.      Objective   /66   Pulse 72   Temp 35.8 °C (96.4 °F)   Ht 1.575 m (5' 2\")   Wt 62.7 kg (138 lb 3.2 oz)   SpO2 99%   BMI 25.28 kg/m²   Lab Results   Component Value Date    WBC 5.8 11/08/2023    HGB 13.4 11/08/2023    HCT 39.8 11/08/2023     11/08/2023    CHOL 242 (H) 07/02/2021    TRIG 90 07/02/2021    HDL 72.7 07/02/2021    ALT 11 11/08/2023    AST 16 11/08/2023     11/08/2023    K 3.9 11/08/2023     11/08/2023    CREATININE 0.64 11/08/2023    BUN 11 11/08/2023    CO2 29 11/08/2023    TSH 1.36 07/02/2021           Physical Exam  Vitals reviewed.   Constitutional:       Appearance: Normal appearance. She is normal weight.   HENT:      Head: Normocephalic and atraumatic.      Mouth/Throat:      Pharynx: No posterior oropharyngeal erythema.     Eyes:      General: No scleral icterus.     Conjunctiva/sclera: Conjunctivae normal.      Pupils: Pupils are equal, round, and reactive to light.       Cardiovascular:      Rate and Rhythm: Normal rate and regular rhythm.      Heart sounds: Normal heart sounds.   Pulmonary:      Effort: No respiratory distress.      Breath sounds: No wheezing.   Abdominal:      General: Abdomen is flat. Bowel sounds are normal. There is no distension.      Palpations: Abdomen is soft. There is no " mass.      Tenderness: There is no abdominal tenderness. There is no rebound.     Musculoskeletal:         General: Normal range of motion.      Cervical back: Normal range of motion and neck supple.     Skin:     General: Skin is warm and dry.     Neurological:      General: No focal deficit present.      Mental Status: She is alert and oriented to person, place, and time. Mental status is at baseline.     Psychiatric:         Mood and Affect: Mood normal.         Behavior: Behavior normal.         Thought Content: Thought content normal.         Judgment: Judgment normal.         Problem List Items Addressed This Visit           ICD-10-CM    Mild intermittent asthma without complication ([facility]) J45.20     Other Visit Diagnoses         Codes      Routine general medical examination at a health care facility    -  Primary Z00.00    Relevant Orders    CBC and Auto Differential    Comprehensive Metabolic Panel    Lipid Panel    TSH with reflex to Free T4 if abnormal      Hypercholesteremia     E78.00      Arthritis     M19.90    Relevant Orders    C-reactive protein      Postmenopausal osteoporosis     M81.0    Relevant Orders    XR DEXA bone density      Vitamin D deficiency     E55.9    Relevant Orders    Vitamin D 25-Hydroxy,Total (for eval of Vitamin D levels)      Anxiety     F41.9      BMI 25.0-25.9,adult     Z68.25          Assessment/Plan       Last seen 12-23    Yearly physical    Mammogram  12-24  Dexa 6-2021 osteoporosis  Cologuard 11-22 neg  CT chest lung cancer screening n/a  GYN 9-23  immunizations rev'd shingrix, pneumo,  tetanus pt declined  BMI 23.2    Follow up         Anxiety due to daughter transitioning       counselled     asthma uses albuterol prn     Hypercholesterolemia on diet     osteoporosis, vit D  on supplements     GYN follow up thru [facility]  Mammogram 12-24     diet / exercise rev'd     Check labs, dexa    Follow up pending / lost job

## 2025-08-07 LAB
25(OH)D3+25(OH)D2 SERPL-MCNC: 37 NG/ML (ref 30–100)
ALBUMIN SERPL-MCNC: 4.4 G/DL (ref 3.6–5.1)
ALP SERPL-CCNC: 54 U/L (ref 37–153)
ALT SERPL-CCNC: 6 U/L (ref 6–29)
ANION GAP SERPL CALCULATED.4IONS-SCNC: 7 MMOL/L (CALC) (ref 7–17)
AST SERPL-CCNC: 12 U/L (ref 10–35)
BASOPHILS # BLD AUTO: 41 CELLS/UL (ref 0–200)
BASOPHILS NFR BLD AUTO: 0.9 %
BILIRUB SERPL-MCNC: 0.4 MG/DL (ref 0.2–1.2)
BUN SERPL-MCNC: 13 MG/DL (ref 7–25)
CALCIUM SERPL-MCNC: 9.2 MG/DL (ref 8.6–10.4)
CHLORIDE SERPL-SCNC: 109 MMOL/L (ref 98–110)
CHOLEST SERPL-MCNC: 249 MG/DL
CHOLEST/HDLC SERPL: 3.5 (CALC)
CO2 SERPL-SCNC: 26 MMOL/L (ref 20–32)
CREAT SERPL-MCNC: 0.57 MG/DL (ref 0.5–1.03)
CRP SERPL-MCNC: <3 MG/L
EGFRCR SERPLBLD CKD-EPI 2021: 106 ML/MIN/1.73M2
EOSINOPHIL # BLD AUTO: 189 CELLS/UL (ref 15–500)
EOSINOPHIL NFR BLD AUTO: 4.1 %
ERYTHROCYTE [DISTWIDTH] IN BLOOD BY AUTOMATED COUNT: 13.3 % (ref 11–15)
GLUCOSE SERPL-MCNC: 98 MG/DL (ref 65–99)
HCT VFR BLD AUTO: 38.3 % (ref 35–45)
HDLC SERPL-MCNC: 72 MG/DL
HGB BLD-MCNC: 12.5 G/DL (ref 11.7–15.5)
LDLC SERPL CALC-MCNC: 160 MG/DL (CALC)
LYMPHOCYTES # BLD AUTO: 1900 CELLS/UL (ref 850–3900)
LYMPHOCYTES NFR BLD AUTO: 41.3 %
MCH RBC QN AUTO: 31.7 PG (ref 27–33)
MCHC RBC AUTO-ENTMCNC: 32.6 G/DL (ref 32–36)
MCV RBC AUTO: 97.2 FL (ref 80–100)
MONOCYTES # BLD AUTO: 271 CELLS/UL (ref 200–950)
MONOCYTES NFR BLD AUTO: 5.9 %
NEUTROPHILS # BLD AUTO: 2199 CELLS/UL (ref 1500–7800)
NEUTROPHILS NFR BLD AUTO: 47.8 %
NONHDLC SERPL-MCNC: 177 MG/DL (CALC)
PLATELET # BLD AUTO: 287 THOUSAND/UL (ref 140–400)
PMV BLD REES-ECKER: 9.2 FL (ref 7.5–12.5)
POTASSIUM SERPL-SCNC: 4 MMOL/L (ref 3.5–5.3)
PROT SERPL-MCNC: 6.6 G/DL (ref 6.1–8.1)
RBC # BLD AUTO: 3.94 MILLION/UL (ref 3.8–5.1)
SODIUM SERPL-SCNC: 142 MMOL/L (ref 135–146)
TRIGL SERPL-MCNC: 71 MG/DL
TSH SERPL-ACNC: 1.11 MIU/L (ref 0.4–4.5)
WBC # BLD AUTO: 4.6 THOUSAND/UL (ref 3.8–10.8)

## 2025-08-11 ENCOUNTER — APPOINTMENT (OUTPATIENT)
Dept: RADIOLOGY | Facility: HOSPITAL | Age: 60
End: 2025-08-11
Payer: COMMERCIAL

## 2025-08-11 ENCOUNTER — APPOINTMENT (OUTPATIENT)
Dept: RADIOLOGY | Facility: CLINIC | Age: 60
End: 2025-08-11
Payer: COMMERCIAL